# Patient Record
Sex: FEMALE | Race: WHITE | NOT HISPANIC OR LATINO
[De-identification: names, ages, dates, MRNs, and addresses within clinical notes are randomized per-mention and may not be internally consistent; named-entity substitution may affect disease eponyms.]

---

## 2020-07-06 ENCOUNTER — RESULT REVIEW (OUTPATIENT)
Age: 50
End: 2020-07-06

## 2021-10-14 ENCOUNTER — RESULT REVIEW (OUTPATIENT)
Age: 51
End: 2021-10-14

## 2021-10-15 ENCOUNTER — OUTPATIENT (OUTPATIENT)
Dept: OUTPATIENT SERVICES | Facility: HOSPITAL | Age: 51
LOS: 1 days | End: 2021-10-15

## 2021-10-15 ENCOUNTER — APPOINTMENT (OUTPATIENT)
Dept: ULTRASOUND IMAGING | Facility: CLINIC | Age: 51
End: 2021-10-15
Payer: COMMERCIAL

## 2021-10-15 PROCEDURE — 76830 TRANSVAGINAL US NON-OB: CPT | Mod: 26

## 2021-10-28 ENCOUNTER — TRANSCRIPTION ENCOUNTER (OUTPATIENT)
Age: 51
End: 2021-10-28

## 2021-10-28 ENCOUNTER — OUTPATIENT (OUTPATIENT)
Dept: OUTPATIENT SERVICES | Facility: HOSPITAL | Age: 51
LOS: 1 days | Discharge: HOME | End: 2021-10-28
Payer: COMMERCIAL

## 2021-10-28 ENCOUNTER — RESULT REVIEW (OUTPATIENT)
Age: 51
End: 2021-10-28

## 2021-10-28 PROCEDURE — 88305 TISSUE EXAM BY PATHOLOGIST: CPT | Mod: 26

## 2021-10-28 PROCEDURE — 43239 EGD BIOPSY SINGLE/MULTIPLE: CPT

## 2021-10-28 PROCEDURE — 88305 TISSUE EXAM BY PATHOLOGIST: CPT

## 2021-10-28 PROCEDURE — G0121: CPT

## 2021-10-28 PROCEDURE — 82657 ENZYME CELL ACTIVITY: CPT

## 2021-10-29 LAB — SURGICAL PATHOLOGY STUDY: SIGNIFICANT CHANGE UP

## 2021-11-01 LAB
B-GALACTOSIDASE TISS-CCNT: 433.2 U/G — SIGNIFICANT CHANGE UP
DISACCHARIDASES TSMI-IMP: SIGNIFICANT CHANGE UP
ISOMALTASE TISS-CCNT: 36.4 U/G — SIGNIFICANT CHANGE UP
PALATINASE TISS-CCNT: 135.6 U/G — SIGNIFICANT CHANGE UP
SUCRASE TISS-CCNT: 6.6 U/G — LOW

## 2021-11-08 ENCOUNTER — APPOINTMENT (OUTPATIENT)
Dept: OTOLARYNGOLOGY | Facility: CLINIC | Age: 51
End: 2021-11-08
Payer: COMMERCIAL

## 2021-11-08 ENCOUNTER — NON-APPOINTMENT (OUTPATIENT)
Age: 51
End: 2021-11-08

## 2021-11-08 VITALS
TEMPERATURE: 96.4 F | SYSTOLIC BLOOD PRESSURE: 95 MMHG | OXYGEN SATURATION: 94 % | WEIGHT: 191 LBS | BODY MASS INDEX: 28.29 KG/M2 | DIASTOLIC BLOOD PRESSURE: 61 MMHG | HEIGHT: 69 IN | HEART RATE: 68 BPM

## 2021-11-08 DIAGNOSIS — Z78.9 OTHER SPECIFIED HEALTH STATUS: ICD-10-CM

## 2021-11-08 DIAGNOSIS — Z87.891 PERSONAL HISTORY OF NICOTINE DEPENDENCE: ICD-10-CM

## 2021-11-08 DIAGNOSIS — K29.70 GASTRITIS, UNSPECIFIED, W/OUT BLEEDING: ICD-10-CM

## 2021-11-08 DIAGNOSIS — G43.909 MIGRAINE, UNSPECIFIED, NOT INTRACTABLE, W/OUT STATUS MIGRAINOSUS: ICD-10-CM

## 2021-11-08 DIAGNOSIS — G43.109 MIGRAINE WITH AURA, NOT INTRACTABLE, W/OUT STATUS MIGRAINOSUS: ICD-10-CM

## 2021-11-08 PROCEDURE — 92550 TYMPANOMETRY & REFLEX THRESH: CPT

## 2021-11-08 PROCEDURE — 92504 EAR MICROSCOPY EXAMINATION: CPT

## 2021-11-08 PROCEDURE — 92557 COMPREHENSIVE HEARING TEST: CPT

## 2021-11-08 PROCEDURE — 99203 OFFICE O/P NEW LOW 30 MIN: CPT | Mod: 25

## 2021-11-08 RX ORDER — RIZATRIPTAN BENZOATE 10 MG/1
TABLET ORAL
Refills: 0 | Status: ACTIVE | COMMUNITY

## 2021-11-08 RX ORDER — ERENUMAB-AOOE 70 MG/ML
70 INJECTION SUBCUTANEOUS
Refills: 0 | Status: ACTIVE | COMMUNITY

## 2021-11-08 RX ORDER — DULOXETINE HYDROCHLORIDE 30 MG/1
CAPSULE, DELAYED RELEASE ORAL
Refills: 0 | Status: ACTIVE | COMMUNITY

## 2021-11-08 NOTE — PHYSICAL EXAM
[Binocular Microscopic Exam] : Binocular microscopic exam was performed [Normal] : orientation to person, place, and time: normal [de-identified] : EOMI/PERRL w/ no resting nystagmus; CN 2-12 intact; good smooth pursuit; negative fistula test AU; negative [facility] & supine roll test bilaterally w/ Frenzel goggles; normal Hamalgyi head thrust; negative enhanced Rhomberg; normal Fukuda; unremarkable gait; no dysdiadochokinesia

## 2021-11-08 NOTE — HISTORY OF PRESENT ILLNESS
[de-identified] : Relatively sudden onset of bilateral hearing loss during 6/21 but not a lot of trouble w/ daily communication. No tinnitus. Denies: ear pain, drainage, frequent loud noise exp/shooting, hx of ear surgery or IV antibiotics/chemo; her father has hearing loss. Qtip use: yes. Ear infections as a child but no BMT. \par Short periods of vertigo w/o change in her ear sxs that she feels is strongly associated with her migraines (w/ photo/phonophobia); takes Aimovig which helps a lot. Prone to motion sickness.

## 2021-11-08 NOTE — CONSULT LETTER
[Dear  ___] : Dear  [unfilled], [Consult Letter:] : I had the pleasure of evaluating your patient, [unfilled]. [Please see my note below.] : Please see my note below. [Consult Closing:] : Thank you very much for allowing me to participate in the care of this patient.  If you have any questions, please do not hesitate to contact me. [Sincerely,] : Sincerely, [FreeTextEntry3] : ANGELICA Zarco Jr, MD, FAAOHNS\par Otolaryngologist\par [facility]

## 2021-11-08 NOTE — ASSESSMENT
[FreeTextEntry1] : Agree that her vertigo is likely migraine-related but offered VNG if she desires to pursue this. \par \par Reassured her regarding her hearing.

## 2022-10-04 ENCOUNTER — APPOINTMENT (OUTPATIENT)
Dept: OPHTHALMOLOGY | Facility: CLINIC | Age: 52
End: 2022-10-04

## 2022-10-04 ENCOUNTER — NON-APPOINTMENT (OUTPATIENT)
Age: 52
End: 2022-10-04

## 2023-01-23 ENCOUNTER — APPOINTMENT (OUTPATIENT)
Dept: PHYSICAL MEDICINE AND REHAB | Facility: CLINIC | Age: 53
End: 2023-01-23
Payer: COMMERCIAL

## 2023-01-23 VITALS — WEIGHT: 184 LBS | HEIGHT: 69 IN | BODY MASS INDEX: 27.25 KG/M2

## 2023-01-23 DIAGNOSIS — M54.50 LOW BACK PAIN, UNSPECIFIED: ICD-10-CM

## 2023-01-23 PROCEDURE — 99203 OFFICE O/P NEW LOW 30 MIN: CPT

## 2023-01-23 NOTE — REVIEW OF SYSTEMS
[Patient Intake Form Reviewed] : Patient intake form was reviewed [FreeTextEntry1] : Constitutional: no chills, not feeling tired and no fever. \par Eyes: no discharge, no pain and no redness. \par ENT: no nasal discharge, no nosebleeds and no sore throat. \par Cardiovascular: no chest pain, no palpitations and the heart rate was not fast. \par Respiratory: no shortness of breath, no cough and no wheezing. \par Gastrointestinal: no abdominal pain, no diarrhea, no vomiting and no melena. \par Genitourinary: no pelvic pain, no dysuria, no abnormal urethral discharge and no frequency. \par Integumentary: no skin lesions and no change in a mole. \par Neurological: no dizziness, no fainting and no convulsions. \par Endocrine: no deepening of the voice and no hot flashes. \par Hematologic/Lymphatic: does not bleed easily, no swollen glands and no cervical lymphadenopathy. \par Musculoskeletal: as per HPI, otherwise denies additional joint pain, effusions, stiffness.\par All other review of systems are negative except as noted.  good balance

## 2023-01-23 NOTE — ASSESSMENT
[FreeTextEntry1] : Impression:\par 1. Back Pain\par \par Plan: The history, physical examination and imaging were reviewed. Based on imaging findings and clinical exam, the patients symptoms do not seem to be spine in etiology. There is the possibility of underlying kidney issues as the source for her pain, as her spine exam is non-provocative and imaging reveals no structural pathology. At this time I would only recommend a course of physical therapy for overall spine health as the treatment plan, and the patient will follow up with her PCP and Nephrologist. If symptoms improve with PT/HEP I would be pleased, however if there is no significant change I would not be surprisedThe patient was educated on red flag symptoms and was instructed to call the office should the current condition worsen or new symptoms develop. The patient will follow up in 6-8 weeks. The patient expressed verbal understanding and is in agreement with the plan of care. All of the patient's questions and concerns were addressed during today's visit.\par

## 2023-01-23 NOTE — HISTORY OF PRESENT ILLNESS
[FreeTextEntry1] : Referring Physician: Dr. Collette Ho\par \par 01/23/2023 Ms. ROCHELLE BRANTLEY is a very pleasant 52 year female who comes in for evaluation of  Left Lower Back Pain that has been ongoing for 3-4 months without any specific injury or inciting event. Patient has tried no treatment as of yet. The pain is located primarily focal to the Left Lower back/flank non-radiating intermittent and described as dull. The pain is rated as 2/10 and ranges from 0-9/10. The patient's symptoms are aggravated predominantly at night, waking her from sleep, and alleviated by Heat; otherwise there are no provocative activities. The patient works as a  which consists of sitting. The patient denies any night pain, numbness/tingling, weakness, or bowel/bladder dysfunction. The patient has no other complaints at this time.\par

## 2023-01-23 NOTE — PHYSICAL EXAM
[FreeTextEntry1] : LUMBAR\par GEN: AAOx3. NAD.\par LS ROM: Flexion, extension, side-bending, rotation, oblique extension all full/pain free.  \par HIP ROM: Full and pain B/L.\par PALP: No TTP midline spinous processes, paravertebral muscles, SIJ, or greater trochanters B/L.\par INSP: Spine alignment is midline, with no evidence of scoliosis.\par STRENGTH: 5/5 bilateral hip flexors, knee extensors, ankle dorsiflexors, long toe extensors, ankle plantar flexors, hip abductors.\par SENS: Grossly intact to LT BLE.\par REFLEXES: Symmetric patella, achilles. \par TONE: Normal, No clonus.\par STANCE: No Trendelenburg with single leg stance.\par GAIT: Non antalgic, normal reciprocating heel to toe\par SPECIAL: SLR/Slump (-) bilaterally. FADIR (-) B/L. JUAN (-) B/L.

## 2023-08-09 ENCOUNTER — APPOINTMENT (OUTPATIENT)
Dept: PHYSICAL MEDICINE AND REHAB | Facility: CLINIC | Age: 53
End: 2023-08-09
Payer: COMMERCIAL

## 2023-08-09 ENCOUNTER — TRANSCRIPTION ENCOUNTER (OUTPATIENT)
Age: 53
End: 2023-08-09

## 2023-08-09 VITALS
WEIGHT: 184 LBS | DIASTOLIC BLOOD PRESSURE: 61 MMHG | HEIGHT: 69 IN | OXYGEN SATURATION: 97 % | HEART RATE: 61 BPM | BODY MASS INDEX: 27.25 KG/M2 | SYSTOLIC BLOOD PRESSURE: 96 MMHG

## 2023-08-09 DIAGNOSIS — R29.898 OTHER SYMPTOMS AND SIGNS INVOLVING THE MUSCULOSKELETAL SYSTEM: ICD-10-CM

## 2023-08-09 DIAGNOSIS — Z86.39 PERSONAL HISTORY OF OTHER ENDOCRINE, NUTRITIONAL AND METABOLIC DISEASE: ICD-10-CM

## 2023-08-09 DIAGNOSIS — Z85.3 PERSONAL HISTORY OF MALIGNANT NEOPLASM OF BREAST: ICD-10-CM

## 2023-08-09 DIAGNOSIS — Z86.718 PERSONAL HISTORY OF OTHER VENOUS THROMBOSIS AND EMBOLISM: ICD-10-CM

## 2023-08-09 PROCEDURE — 99214 OFFICE O/P EST MOD 30 MIN: CPT

## 2023-08-09 NOTE — PHYSICAL EXAM
[FreeTextEntry1] : Gen: A+O x 3 in NAD Psych: Normal mood and affect. Responds appropriately to commands Eyes: Anicteric. No discharge. EOMI. Resp: Breathing unlabored CV: radial pulses 2+ and equal. No varicosities noted Ext: No c/c/e Skin: No lesions noted  Gait: Non antalgic  +  reciprocating heel to toe able to stand on toes and heels WITHout hand holding Tandem gait intact WITHout hand holding Able to stand on either lower limb without LOB for 15 seconds Romberg negative  Inspection: Spine alignment is midline, with no evidence of scoliosis. Iliac crest heights and PSIS heights level.  + Forward head position.  Palpation: There is neg tenderness over the upper traps, middle traps, levator scaps, rhomboids, articular pillars, cervical paraspinals  Cervical ROM: Flexion, extension, side-bending, rotation, limited due to pain with most pain at terminal ROM  Finger to nose bilaterally intact  	C5 (Shoulder Abduction)        C5 (Elbow Flex)	        C6 (Wrist Ext)    Right	5/5	                                5/5	                          5/5	        Left	        5/5	                                5/5	                          5/5	            	C7 (Elbow Ext)            C7 (Wrist Flex)             C8 (Long Finger Flex)          T1 (Finger Abduct)            Right	5/5	                    5/5                                      5/5	                                      5/5	                                                  Left	5/5	                    5/5                                      5/5	                                      5/5	                                 	C8 (Thumb Ext)            C8 & T1 (Thumb Opp)             Right	5/5	                           5/5	                                                  Left	5/5	                           5/5                               Tone: Normal. No cog wheeling.  Proprioception at DIPs intact B/L Sensation: Grossly intact to light touch and pinprick bilateral upper extremities. Reflexes: 3+ symmetric biceps, pronators, brachioradialis, triceps Hoffmans Sign negative Spurling's Sign negative Shoulder Abduction Test (Bakodys) absent Lhermittes Sign negative  Lumbar  Gait: Non antalgic  +  reciprocating heel to toe able to stand on toes and heels WITHout hand holding Tandem gait intact WITHout hand holding Able to stand on either lower limb without LOB for 15 seconds Romberg negative  Neg Trendelenburg sign with single leg stance  Inspection: Spine alignment is midline. Palpation: There is + tenderness over the midline spinous processes, paravertebral muscles of the thoracolumbar region + TTP over the spinous process T10-L1 region + TTP over the thoracic paraspinal muscles + TTP over the lumbar region  Lumbar ROM: Flexion, extension, side-bending, rotation, limited in most planes Thoracic ROM: limited > left > right + pain with lateral flexion, right + pain with oblique extension, right + pain with lateral rotation, right  Hip ROM: + pain at terminal ROM right FAIR, FABERE negative bilaterally.  	Hip Flex       Knee Ext      Ankle Dorsi           EHL        Ankle Plantar Right	4/5	        5/5	                 5/5	          4+/5	             5/5                            Left	        4/5	        5/5	                 4/5	          4/5	             5/5                             Hip abduction R 4+/5 L 4/5 Hip adduction R 4+/5 L 4/5 Hip extension R 4+/5 L 4/5 Knee Flexion R 4+/5 L 4/5  Tone: Normal. No clonus. Sensation: Grossly intact to light touch bilateral lower limbs. Proprioception: Intact at big toes bilaterally. Reflexes: 3+ symmetric knee jerk, ankle jerk.  Plantars downgoing bilaterally.  SLR negative bilaterally Crossed SLR negative bilaterally. Slump Test negative bilaterally  prone gapping test neg B/L yeoman neg B/L nachlas neg B/L active hip extension was more difficult on

## 2023-08-09 NOTE — HISTORY OF PRESENT ILLNESS
[FreeTextEntry1] : Andrea Hale M.D. Sports Medicine and Interventional Spine Department of Physical Medicine and Rehabilitation  [facility] Orthopaedic [facility] [address] [place] [address]  [facility] [place] at [facility] [address] [place] [address]  [facility] at  [facility] [address], [address] [place] [address]  For [place] Appointments Phone: [phone] Fax: [phone]  ----------------------------------------------------------------------------------------------------------------------------------------  PATIENT: ROCHELLE BRANTLEY  MRN: [id number]  YOB: 1970  DATE OF SERVICE: 08/09/2023   Aug 09, 2023   Dear Roxy  Thank you for referring ROCHELLE BRANTLEY to my Sports and Interventional Spine practice and office. Enclosed is a copy of the patient's consultation/progress note, which includes my complete assessment and recent studies completed during the patient's evaluation.  If you have questions or have any patients who require nonsurgical, non-opiate management of any sports, spine, or musculoskeletal conditions, please do not hesitate to contact my , Jessica Jacobo at [phone].  I look forward to taking care of your patients along with you.  Sincerely,  Andrea Hale MD Sports, Interventional Spine, & Regenerative Musculoskeletal Medicine Orthopaedic [place] [facility]                                                     Initial Consultation: CC: intermittent back pain   HPI:  This is the first visit to [facility] Sports Medicine and Interventional Spine Practice.    ROCHELLE BRANTLEY presents with the chief complaint as above.    Initial Hx on 08/09/2023 : Presents in person to [facility] patient has chronic low back pain without inciting event that started last year  patient had been informed of rather unremarkable lumbar imaging patient interested in understanding possibility of lumbar pain being related to their recent cancer diagnosis overall the pain in the right thoracolumbar region is worse overnight points to the b/l thoracic paraspinals, more left sided pain worsened after air travel  The patients difficulties began 6/2022   The pain is graded as 5/10 last 5/10 8/8/2023 during PCP examination The pain is described as variable throbbing, aching, dull The pain is intermittent The pain does not radiate The pain radiates in the B/L LOWER limbs in a L5 distribution The patient feels that the pain is overall persistent  Patient denies other recent fall, MVA, injury, trauma, or accident besides presenting history above  Aggravating: lateral rotation, lateral flexion, prolonged sitting, prolonged standing, navigating stairs, getting out of bed, sit to stand transitional movements Alleviating: rest, activity modification, avoiding prolonged sitting, pharmacologic treatments  Meds: denies regular PO pain medications Therapy Program: no recent structured targeted therapy program HEP: doing HEP regularly  Assoc Sx: Denies numbness, Tingling  Denies Focal motor weakness in the upper or lower limbs Denies New or worsened bowel or bladder incontinence Denies Saddle anesthesia Denies Using Orthotic(s)/Supportive devices Denies Swelling in the upper/lower extremities They also deny frequent tripping, falling  ROS: A 14 point review of systems was completed. Positive findings are pain as described above. The remaining systems negative.  Breast Cancer Surveillance: up to date, undergoing breast cancer surveillance; plan for surgical intervention 8/28/2023 COVID HX: reviewed  Assoc Hx: Ambulates without assistive device Injection Hx: denies locally directed treatment to the area in question Imaging Hx: reviewed  Level of functioning: indep with ambulation, indep with ADLs Living Situation: dwelling with steps to enter

## 2023-08-09 NOTE — ASSESSMENT
[FreeTextEntry1] :                                                       Assessment/Plan:  ROCHELLE BRANTLEY is a 53 year female with night time thoracic back pain with h/o active breast cancer here for initial consultation.  Annular tear of thoracic region Thoracic back pain Thoracic spondylosis Spasm of thoracic paraspinous muscles Night-time pain Rib fracture, remote Breast Cancer, active  - Tiers of treatment and management of above diagnosis(es) were discussed with patient - Optimal diet, weight, sleep, and lifestyle management to minimize stress and maximize well being counseling provided - Imaging reviewed and discussed with patient - Patient was advised to hold off on a structured, targeted therapy program 2-3x/wk for 8 wks with goal toward HEP - Patient was educated on an appropriate home exercise program, provided with exercise recommendations, all questions answered - Patient may trial acetaminophen 1000mg up to TID PRN moderate to severe pain and to decrease average consumption of NSAIDs - Patient was advised to apply cool compresses or warm heat to affected regions PRN  - Educated about red flag symptoms including (but not limited to) new, worsened, or persistent: fever greater than 100F, bowel or bladder incontinence, bowel obstipation, inability to void urine, urinary leakage, Severe nausea or vomiting, Worsening numbness, worsening tingling/paresthesias, and/or new or progressive motor weakness; advised to seek immediate medical attention at his nearest Emergency department should they experience any of the above  - MRI thoracic spine without contrast is indicated given that the pt has not improved with tylenol, ibuprofen, naproxen, patient planned for surgical intervention for breast cancer near future advised to avoid further radiation to the thoracic/ribs/lumbar region due to ongoing malignancy, and physical therapy/home exercise program>6 weeks. Patient's imaging is medically necessary to outline targets for locally (interventional) directed treatments and/or guide surgical management.  - Follow up in 2-3 weeks after imaging, in person in 2 months to assess their progress  I have personally spent a total of at least 65 minutes preparing, reviewing internal and external records, explaining, counseling, and coordinating care for this patient encounter.  Thank you, (s), for allowing me to participate in the care of your patient. Please do not hesitate to contact me with questions/concerns.  Andrea Hale M.D. Sports and Interventional Spine Department of Physical Medicine and Rehabilitation  [facility] Physician [facility] Orthopaedic [address]th [place] [address]  Appointments: [phone] Fax: [phone]

## 2023-08-17 ENCOUNTER — OUTPATIENT (OUTPATIENT)
Dept: OUTPATIENT SERVICES | Facility: HOSPITAL | Age: 53
LOS: 1 days | End: 2023-08-17

## 2023-08-17 ENCOUNTER — APPOINTMENT (OUTPATIENT)
Dept: MRI IMAGING | Facility: CLINIC | Age: 53
End: 2023-08-17
Payer: COMMERCIAL

## 2023-08-17 PROCEDURE — 72146 MRI CHEST SPINE W/O DYE: CPT | Mod: 26

## 2023-08-24 ENCOUNTER — APPOINTMENT (OUTPATIENT)
Dept: PHYSICAL MEDICINE AND REHAB | Facility: CLINIC | Age: 53
End: 2023-08-24
Payer: COMMERCIAL

## 2023-08-24 DIAGNOSIS — M54.6 PAIN IN THORACIC SPINE: ICD-10-CM

## 2023-08-24 DIAGNOSIS — M62.830 MUSCLE SPASM OF BACK: ICD-10-CM

## 2023-08-24 DIAGNOSIS — M51.24 OTHER INTERVERTEBRAL DISC DISPLACEMENT, THORACIC REGION: ICD-10-CM

## 2023-08-24 DIAGNOSIS — M47.814 SPONDYLOSIS W/OUT MYELOPATHY OR RADICULOPATHY, THORACIC REGION: ICD-10-CM

## 2023-08-24 DIAGNOSIS — C50.919 MALIGNANT NEOPLASM OF UNSPECIFIED SITE OF UNSPECIFIED FEMALE BREAST: ICD-10-CM

## 2023-08-24 DIAGNOSIS — G89.29 PAIN IN THORACIC SPINE: ICD-10-CM

## 2023-08-24 DIAGNOSIS — M51.34 OTHER INTERVERTEBRAL DISC DEGENERATION, THORACIC REGION: ICD-10-CM

## 2023-08-24 PROCEDURE — 99215 OFFICE O/P EST HI 40 MIN: CPT | Mod: 95

## 2023-08-24 NOTE — ASSESSMENT
[FreeTextEntry1] :                                                       Assessment/Plan:  ROCHELLE BRANTLEY is a 53 year female with night time thoracic back pain with h/o active breast cancer here for follow up visit  Annular tear of thoracic region Thoracic back pain Thoracic spondylosis Intervertebral disc disorder, T3/4 Spasm of thoracic paraspinous muscles Postural imbalance Night-time pain Rib fracture, remote Breast Cancer, active  - Tiers of treatment and management of above diagnosis(es) were discussed with patient - Optimal diet, weight, sleep, and lifestyle management to minimize stress and maximize well being counseling provided - Imaging reviewed and discussed with patient - Patient was advised to start a structured, targeted therapy program 2-3x/wk for 8 wks with goal toward HEP - Patient was educated on an appropriate home exercise program, provided with exercise recommendations, all questions answered - Patient may trial acetaminophen 1000mg up to TID PRN moderate to severe pain and to decrease average consumption of NSAIDs - Patient was advised to apply cool compresses or warm heat to affected regions PRN  - Educated about red flag symptoms including (but not limited to) new, worsened, or persistent: fever greater than 100F, bowel or bladder incontinence, bowel obstipation, inability to void urine, urinary leakage, Severe nausea or vomiting, Worsening numbness, worsening tingling/paresthesias, and/or new or progressive motor weakness; advised to seek immediate medical attention at his nearest Emergency department should they experience any of the above  - Follow up in person in 2 months to assess their progress  I have personally spent a total of at least 40 minutes preparing, reviewing internal and external records, explaining, counseling, and coordinating care for this patient encounter.  Thank you, (s), for allowing me to participate in the care of your patient. Please do not hesitate to contact me with questions/concerns.  Andrea Hale M.D. Sports and Interventional Spine Department of Physical Medicine and Rehabilitation  [facility] Orth[facility] [address] [place] [address]  Appointments: [phone] Fax: [phone]

## 2023-08-24 NOTE — HISTORY OF PRESENT ILLNESS
Patient informed, concerned with no having insurance and didn't want to go to [facility]. Sent over Prednisone.
patient does not any insurance at this time. Patient tongue, hands and feet are swollen    No fever    No problems breathing    Is asking for a steroid to be called in?     Please advise
[FreeTextEntry1] : Andrea Hale M.D. Sports Medicine and Interventional Spine Department of Physical Medicine and Rehabilitation  [facility] Orthopa[facility] [address] [place] [address]  [facility] [place] at [facility] [address] [place] [address]  [facility] at  [facility] [address], [address] [place] [address]  For [place] Appointments Phone: [phone] Fax: [phone]  ----------------------------------------------------------------------------------------------------------------------------------------  PATIENT: ROCHELLE BRANTLEY  MRN: [id number]  YOB: 1970  DATE OF SERVICE: 08/24/2023  Aug 24, 2023   Dear Roxy  Thank you for referring ROCHELLE BRANTLEY to my Sports and Interventional Spine practice and office. Enclosed is a copy of the patient's consultation/progress note, which includes my complete assessment and recent studies completed during the patient's evaluation.  If you have questions or have any patients who require nonsurgical, non-opiate management of any sports, spine, or musculoskeletal conditions, please do not hesitate to contact my , Jessica Jacobo at [phone].  I look forward to taking care of your patients along with you.  Sincerely,  Andrea Hale MD Sports, Interventional Spine, & Regenerative Musculoskeletal Medicine Orthopaedic [facility]                                                     Follow Up Visit CC: intermittent back pain   HPI:  This is a follow up telemedicine visit to [facility] Orthopa[facility] Sports Medicine and Interventional Spine Practice.    ROCHELLE BRANTLEY presents with the chief complaint as above.    Interval Hx on Aug 24, 2023: presents for follow up. Since last visit, they underwent further diagnostic workup including additional imaging studies. Patient informed of all relevant imaging findings, all questions were answered including T3-4 left paracentral disc herniation with mild central canal stenosis. Since the last visit, they relate improvements in axial pain previously associated with known exacerbating, aggravating factors and situations.  Pharmacologic treatments now include OTC analgesics PRN, otherwise pharmacologic treatments are minimal. Denies new or worsened numbness, tingling, or focal motor deficit. Denies interval fall, accident, or injury.  Denies change in bowel or bladder functioning.  Meds: denies regular PO pain medications Therapy Program: no recent structured targeted therapy program HEP: doing HEP regularly  Assoc Sx: Denies numbness, Tingling  Denies Focal motor weakness in the upper or lower limbs Denies New or worsened bowel or bladder incontinence Denies Saddle anesthesia Denies Using Orthotic(s)/Supportive devices Denies Swelling in the upper/lower extremities They also deny frequent tripping, falling  ROS: A 14 point review of systems was completed. Positive findings are pain as described above. The remaining systems negative.  Breast Cancer Surveillance: up to date, undergoing breast cancer surveillance; plan for surgical intervention 8/28/2023 COVID HX: reviewed  Initial Hx on 08/09/2023: Presents in person to [facility]. patient has chronic low back pain without inciting event that started last year. patient had been informed of rather unremarkable lumbar imaging. patient interested in understanding possibility of lumbar pain being related to their recent cancer diagnosis. overall the pain in the right thoracolumbar region is worse overnight. points to the b/l thoracic paraspinals, more left sided. pain worsened after air travel. The patients difficulties began 6/2022   The pain is graded as 5/10. last 5/10 8/8/2023 during PCP examination. The pain is described as variable throbbing, aching, dull. The pain is intermittent. The pain does not radiate. The pain radiates in the B/L LOWER limbs in a L5 distribution. The patient feels that the pain is overall persistent. Patient denies other recent fall, MVA, injury, trauma, or accident besides presenting history above. Aggravating: lateral rotation, lateral flexion, prolonged sitting, prolonged standing, navigating stairs, getting out of bed, sit to stand transitional movements. Alleviating: rest, activity modification, avoiding prolonged sitting, pharmacologic treatments  Assoc Hx: Ambulates without assistive device Injection Hx: denies locally directed treatment to the area in question Imaging Hx: reviewed  Level of functioning: indep with ambulation, indep with ADLs Living Situation: dwelling with steps to enter

## 2023-08-24 NOTE — PHYSICAL EXAM
[FreeTextEntry1] : General: NAD, pleasant Psych: Mood and affect appropriate HEENT: NC/AT CV: S1, S2 Pulm:breathing unlabored Lymph: Patient reports and shows no enlarged cervical lymph nodes Pulses: Patient reports palpable radial and ulnar pulses bilateral upper extremities Skin: Patient reports and shows no visible rash, ecchymoses, or erythema

## 2023-08-24 NOTE — DATA REVIEWED
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 Thoracic vertebral body bone marrow signal within normal limits. No suspicious expansile or destructive osseous lesion is identified. There is mild multilevel intervertebral space narrowing. Small disc bulges/protrusions are present throughout the thoracic spine without significant canal or foraminal stenosis. No abnormal cord signal identified. No significant periarticular or paraspinal soft tissue edema identified. IMPRESSION: No significant canal or foraminal stenosis of the thoracic spine.

## 2023-08-24 NOTE — REASON FOR VISIT
[Home] : at home, [unfilled] , at the time of the visit. [Medical Office: ([facility])___] : at the medical office located in  [Patient] : the patient [Follow-Up] : a follow-up visit

## 2023-11-02 ENCOUNTER — NON-APPOINTMENT (OUTPATIENT)
Age: 53
End: 2023-11-02

## 2023-11-02 ENCOUNTER — APPOINTMENT (OUTPATIENT)
Dept: PHYSICAL MEDICINE AND REHAB | Facility: CLINIC | Age: 53
End: 2023-11-02

## 2023-11-02 ENCOUNTER — APPOINTMENT (OUTPATIENT)
Dept: OPHTHALMOLOGY | Facility: CLINIC | Age: 53
End: 2023-11-02

## 2024-06-24 ENCOUNTER — APPOINTMENT (OUTPATIENT)
Dept: MRI IMAGING | Facility: CLINIC | Age: 54
End: 2024-06-24

## 2024-06-24 ENCOUNTER — OUTPATIENT (OUTPATIENT)
Dept: OUTPATIENT SERVICES | Facility: HOSPITAL | Age: 54
LOS: 1 days | End: 2024-06-24

## 2024-07-02 ENCOUNTER — APPOINTMENT (OUTPATIENT)
Dept: PHYSICAL MEDICINE AND REHAB | Facility: CLINIC | Age: 54
End: 2024-07-02

## 2025-07-29 ENCOUNTER — NON-APPOINTMENT (OUTPATIENT)
Age: 55
End: 2025-07-29